# Patient Record
Sex: MALE | Race: ASIAN | Employment: STUDENT | URBAN - METROPOLITAN AREA
[De-identification: names, ages, dates, MRNs, and addresses within clinical notes are randomized per-mention and may not be internally consistent; named-entity substitution may affect disease eponyms.]

---

## 2020-07-30 ENCOUNTER — HOSPITAL ENCOUNTER (OUTPATIENT)
Age: 20
Discharge: HOME | End: 2020-07-30
Payer: COMMERCIAL

## 2020-07-30 VITALS
RESPIRATION RATE: 20 BRPM | DIASTOLIC BLOOD PRESSURE: 77 MMHG | SYSTOLIC BLOOD PRESSURE: 122 MMHG | TEMPERATURE: 100 F | HEART RATE: 92 BPM | OXYGEN SATURATION: 97 %

## 2020-07-30 DIAGNOSIS — R50.9 FEVER, UNSPECIFIED FEVER CAUSE: Primary | ICD-10-CM

## 2020-07-30 DIAGNOSIS — R05.9 COUGH: ICD-10-CM

## 2020-07-30 LAB — POCT RAPID STREP: NEGATIVE

## 2020-07-30 PROCEDURE — 87880 STREP A ASSAY W/OPTIC: CPT | Performed by: NURSE PRACTITIONER

## 2020-07-30 PROCEDURE — 99203 OFFICE O/P NEW LOW 30 MIN: CPT | Performed by: NURSE PRACTITIONER

## 2020-07-30 NOTE — ED PROVIDER NOTES
Patient Seen in: [address] [facility]      History   Patient presents with:  Fever  Cough/URI    Stated Complaint: body aches chills fever    79-year-old male who presents to the immediate care with complaints of nasal congestion, cough, body (Oral)   Resp 20   SpO2 97%         Physical Exam  Vitals signs and nursing note reviewed. GENERAL: The patient is well-developed well-nourished, nontoxic, non-ill-appearing. HEENT: Normocephalic. Atraumatic. Extraocular motions are intact.   Patient [id number]  [phone]                Medications Prescribed:  There are no discharge medications for this patient.

## 2020-08-01 LAB — SARS-COV-2 RNA RESP QL NAA+PROBE: DETECTED

## 2020-08-01 NOTE — PROGRESS NOTES
Patient notified of positive covid results. Instructed to follow instructions per Naseem Borrego note. Patient verbalized understanding and agrees to [address].

## 2020-08-08 ENCOUNTER — HOSPITAL ENCOUNTER (OUTPATIENT)
Age: 20
Discharge: HOME | End: 2020-08-08
Payer: COMMERCIAL

## 2020-08-08 VITALS
DIASTOLIC BLOOD PRESSURE: 88 MMHG | TEMPERATURE: 98 F | HEART RATE: 81 BPM | RESPIRATION RATE: 16 BRPM | OXYGEN SATURATION: 99 % | WEIGHT: 165 LBS | SYSTOLIC BLOOD PRESSURE: 127 MMHG

## 2020-08-08 DIAGNOSIS — U07.1 COVID-19: Primary | ICD-10-CM

## 2020-08-08 PROCEDURE — 99213 OFFICE O/P EST LOW 20 MIN: CPT | Performed by: PHYSICIAN ASSISTANT

## 2020-08-08 NOTE — ED PROVIDER NOTES
Patient Seen in: [id number] [facility]      History   Patient presents with:  Testing    Stated Complaint: Covid testing    HPI    71-year-old male who comes in today for retest of COVID-19.   He first started having symptoms of COVID-19 approxi midline, no trismus or drooling no phonation changes, patient handling secretions well   Neck: Supple; no anterior or posterior cervical adenopathy, no neck rigidity or meningeal signs  Lungs: Clear to auscultation bilaterally, respirations unlabored.  No w

## 2020-08-12 LAB — SARS-COV-2 BY PCR: NOT DETECTED

## (undated) NOTE — LETTER
Date & Time: 8/8/2020, 10:45 AM  Patient: Kaelyn Mak  Encounter Provider(s):    Kate Schwartz PA-C       To Whom It May Concern:    Kaelyn Mak was seen and treated in our department on 8/8/2020.  He Patient was tested here today for retes